# Patient Record
Sex: MALE | Race: ASIAN | URBAN - METROPOLITAN AREA
[De-identification: names, ages, dates, MRNs, and addresses within clinical notes are randomized per-mention and may not be internally consistent; named-entity substitution may affect disease eponyms.]

---

## 2017-01-27 ENCOUNTER — OFFICE VISIT (OUTPATIENT)
Dept: ENDOCRINOLOGY | Age: 62
End: 2017-01-27

## 2017-01-27 VITALS
BODY MASS INDEX: 21.19 KG/M2 | HEART RATE: 74 BPM | SYSTOLIC BLOOD PRESSURE: 99 MMHG | DIASTOLIC BLOOD PRESSURE: 60 MMHG | RESPIRATION RATE: 16 BRPM | OXYGEN SATURATION: 98 % | WEIGHT: 148 LBS | HEIGHT: 70 IN

## 2017-01-27 DIAGNOSIS — Z79.4 TYPE 2 DIABETES MELLITUS WITH DIABETIC NEUROPATHY, WITH LONG-TERM CURRENT USE OF INSULIN (HCC): Primary | ICD-10-CM

## 2017-01-27 DIAGNOSIS — E05.90 SUBCLINICAL HYPERTHYROIDISM: ICD-10-CM

## 2017-01-27 DIAGNOSIS — E11.40 TYPE 2 DIABETES MELLITUS WITH DIABETIC NEUROPATHY, WITH LONG-TERM CURRENT USE OF INSULIN (HCC): Primary | ICD-10-CM

## 2017-01-27 RX ORDER — INSULIN DETEMIR 100 [IU]/ML
INJECTION, SOLUTION SUBCUTANEOUS
Refills: 6 | COMMUNITY
Start: 2016-12-24 | End: 2017-01-27 | Stop reason: DRUGHIGH

## 2017-01-27 RX ORDER — PEN NEEDLE, DIABETIC 32GX 5/32"
NEEDLE, DISPOSABLE MISCELLANEOUS
Refills: 6 | COMMUNITY
Start: 2016-12-24

## 2017-01-27 RX ORDER — METFORMIN HYDROCHLORIDE 500 MG/1
500 TABLET ORAL 2 TIMES DAILY WITH MEALS
Qty: 180 TAB | Refills: 3 | Status: SHIPPED | OUTPATIENT
Start: 2017-01-27

## 2017-01-27 RX ORDER — PEN NEEDLE, DIABETIC 31 GX3/16"
1 NEEDLE, DISPOSABLE MISCELLANEOUS DAILY
Qty: 100 PEN NEEDLE | Refills: 11 | Status: SHIPPED | OUTPATIENT
Start: 2017-01-27

## 2017-01-27 NOTE — MR AVS SNAPSHOT
Visit Information Date & Time Provider Department Dept. Phone Encounter #  
 1/27/2017  1:30 PM Greyson Alanis, [address] [place] [address] Diabetes and Endocrinology [phone] [id number] Follow-up Instructions Return in about 6 weeks (around 3/10/2017). Upcoming Health Maintenance Date Due Hepatitis C Screening 1955 FOOT EXAM Q1 12/9/1967 EYE EXAM RETINAL OR DILATED Q1 12/9/1967 Pneumococcal 19-64 Medium Risk (1 of 1 - PPSV23) 12/9/1976 FOBT Q 1 YEAR AGE 50-75 12/9/2007 DTaP/Tdap/Td series (2 - Td) 1/15/2013 HEMOGLOBIN A1C Q6M 10/1/2014 MICROALBUMIN Q1 4/1/2015 LIPID PANEL Q1 4/1/2015 INFLUENZA AGE 9 TO ADULT 8/1/2016 Allergies as of 1/27/2017  Review Complete On: 1/27/2017 By: Greyson Alains MD  
  
 Severity Noted Reaction Type Reactions Metformin  02/23/2010    Other (comments) Weight loss Current Immunizations  Never Reviewed Name Date TDAP Vaccine 1/15/2003 Not reviewed this visit You Were Diagnosed With   
  
 Codes Comments Type 2 diabetes mellitus with diabetic neuropathy, with long-term current use of insulin (HCC)    -  Primary ICD-10-CM: E11.40, Z79.4 ICD-9-CM: 250.60, 357.2, V58.67 Vitals BP Pulse Resp Height(growth percentile) Weight(growth percentile) SpO2  
 99/60 74 16 5' 10\" (1.778 m) 148 lb (67.1 kg) 98% BMI Smoking Status 21.24 kg/m2 Never Smoker Vitals History BMI and BSA Data Body Mass Index Body Surface Area  
 21.24 kg/m 2 1.82 m 2 Preferred Pharmacy Pharmacy Name Phone [facility], [facility] [phone] Your Updated Medication List  
  
   
This list is accurate as of: 1/27/17  2:55 PM.  Always use your most recent med list.  
  
  
  
  
 ACCU-CHEK COMPACT TEST Strp Generic drug:  Blood Sugar Diagnostic, Drum  
  
 aspirin 81 mg tablet Take 81 mg by mouth daily. empagliflozin 10 mg tablet Commonly known as:  Ritta Seat Take 1 Tab by mouth daily. glyBURIDE 1.25 mg tablet Commonly known as:  [employer]o Police TAKE 1 TABLET BY MOUTH TWICE A DAY WITH MEALS  
  
 insulin detemir 100 unit/mL (3 mL) Inpn Commonly known as:  Mynor [place] Inject 15 units daily  
  
 metFORMIN 500 mg tablet Commonly known as:  GLUCOPHAGE Take 1 Tab by mouth two (2) times daily (with meals). * Carmelita Pen Needle 32 gauge x 5/32\" Ndle Generic drug:  Insulin Needles (Disposable) USE TO INJECT LEVEMIR DAILY * Insulin Needles (Disposable) 32 gauge x 5/32\" Ndle Commonly known as:  Carmelita Pen Needle 1 Each by Does Not Apply route daily. simvastatin 20 mg tablet Commonly known as:  ZOCOR Take 1 Tab by mouth nightly. Needs fasting office visit before next refill VITAMIN B-12 PO Take  by mouth. * Notice: This list has 2 medication(s) that are the same as other medications prescribed for you. Read the directions carefully, and ask your doctor or other care provider to review them with you. Prescriptions Sent to Pharmacy Refills  
 empagliflozin (JARDIANCE) 10 mg tablet 1 Sig: Take 1 Tab by mouth daily. Class: Normal  
 Pharmacy: [facility] Ph #: [phone] Route: Oral  
 insulin detemir (LEVEMIR FLEXTOUCH) 100 unit/mL (3 mL) inpn 3 Sig: Inject 15 units daily Class: Normal  
 Pharmacy: [facility] Ph #: [phone]  
 metFORMIN (GLUCOPHAGE) 500 mg tablet 3 Sig: Take 1 Tab by mouth two (2) times daily (with meals). Class: Normal  
 Pharmacy: [facility] Ph #: [phone]  Route: Oral  
 Insulin Needles, Disposable, (SHAQ PEN NEEDLE) 32 gauge x \" ndle 11 Si Each by Does Not Apply route daily. Class: Normal  
 Pharmacy: [facility] #: [phone] Route: Does Not Apply We Performed the Following HEMOGLOBIN A1C WITH EAG [17714 CPT(R)] METABOLIC PANEL, BASIC [16415 CPT(R)] T3, FREE R7808030 CPT(R)] TSH AND FREE T4 [26009 CPT(R)] VITAMIN B12 [id number] CPT(R)] Follow-up Instructions Return in about 6 weeks (around 3/10/2017). Patient Instructions Diabetes Instructions: 
- start Jardiance 10mg daily every morning 
- start metformin 500mg twice a day (with breakfast and dinner) 
- continue Levemir 15 units daily Check blood sugar at least TWICE a day: every morning when you wake up and at bedtime. Keep a record of your values and bring this or your glucometer with you to your next visit. Diet instructions: 
Reduce the amount of carbohydrates in your diet. This means avoiding sweets, sodas, or desserts AND also reducing the amount of bread, pasta, rice, potatoes, corn, and crackers that you eat. Do not have more than one serving of carbs per meal (for example: do not eat a sandwich and potato chips in the same meal). 
  
AIM FOR LESS THAN 30-40 GRAMS OF NET CARBS PER MEAL Net carbs = total carbs (g) - fiber (g) Read food labels! Avoid food with added sugar or anything with more than 5g sugar per serving. Focus on eating mostly protein (meat, poultry, fish, shellfish, eggs), healthy fats (avocados, nuts, cheese, olive or coconut oil) and non-starchy vegetables (greens, carrots, tomatoes, bell peppers, broccoli, brussels sprouts, green beans, etc). If you fill yourself up with these foods, you won't even want the carbs.  
 
Minimize your fruit intake as much as possible, no more than one serving per day. When you do eat fruit, choose lower sugar options like fresh berries. 
  
BREAKFAST: whole eggs, veggies, omelet, plain yogurt, sotelo, sausage LUNCH: salad with meat/poultry, veggies, cheese, nuts; low carb wrap with veggies and meat DINNER: any type of meat/poultry or seafood (without breading), non-starchy vegetables 
  
GOOD LOW-CARB SNACK OPTIONS: string cheese, Babybel cheese, carrots and celery with Ranch dressing, nuts (almonds, pistachios, walnuts, etc), meat (snack size salami, ham, or turkey) BEVERAGES: water, water, water Other options: unsweet tea (okay to add a pack of Splenda if needed), sparkling water without calories (ex: [place], [place]che, etc), coffee (unsweeted creamer is fine) Alcohol: (always in moderation) - lower carb options include red or white wine and champagne (the drier, the better); light beers like Michelob Ultra; some liquors (just avoid the sweet mixers like juices and sodas) Introducing hospitals & HEALTH SERVICES! Dear Erika Armstrong: Thank you for requesting a [employer] account. Our records indicate that you already have an active [employer] account. You can access your account anytime at [email or web address] Did you know that you can access your hospital and ER discharge instructions at any time in [employer]? You can also review all of your test results from your hospital stay or ER visit. Additional Information If you have questions, please visit the Frequently Asked Questions section of the [employer] website at [email or web address] Remember, [employer] is NOT to be used for urgent needs. For medical emergencies, dial 911. Now available from your iPhone and Android! Please provide this summary of care documentation to your next provider. Your primary care clinician is listed as Corey Fleming. If you have any questions after today's visit, please call [phone].

## 2017-01-27 NOTE — PROGRESS NOTES
Endocrinology New Patient Visit    Chief Complaint: Type 2 diabetes    Referring provider:  Diana Ramsay MD    History of Present Illness: Donn Devine is a 63 y.o. male who is self-referred for evaluation of type 2 diabetes mellitus with last A1c 7.4% in September. He was previously followed by Dr Padmini Jones, last visit was in September. He was diagnosed with diabetes 17 years ago. He was initially treated with metformin. This made him lose about 10 lbs. He was taking Januvia but this gave him trouble with his urine and numbness in his hands. He was switched to glyburide instead of Januvia. When he saw Dr Padmini Jones last, he was just taking glyburide and metformin. There was mention of starting Levemir and pt was given a sample pen but was not instructed to start it yet. His endocrinology follow-up was not scheduled until March but he became worried about his blood sugars which were running in the upper 100s-200 range (sometimes as high as 300) on his glyburide and metformin combination. Current glycemic medication regimen is metformin 850mg BID and levemir 15 units daily. He just started taking Levemir on 1/11/16. He brings in a detailed spreadsheet with his medications and blood sugars today. He started with levemir 10 units + metformin 500mg daily. After two days he increased the metformin to 850mg. After 5 days, he doubled the metformin. Home blood glucose monitoring frequency: 1-4 times/day  Glucose range at home: since starting metformin/levemir regimen above, fasting blood sugars have ranged from  and post-prandial values have ranged from 194-363. The patient has not had hypoglycemia. He has no known complications other than some neuropathy symptoms (in fingers) when his blood sugars are high. This has improved since starting the Levemir. Weight is stably low, BMI is 21. He does exercise at the gym 4 times per week (weights, elliptical both 30 minutes each).   He does try to restrict dietary carbohydrate intake, avoids simple carbs like naan and excess rice because it increases his blood sugar. Eats 3-4 meals/day and does snack once a day on almonds. Typical meals are as follows:  Breakfast: sandwich with avocado and banana on whole grain bread  Lunch: subway sandwich or fish  Dinner: curries (wife cooks) with meat, vegetables, and rice    He also has a history of abnormal TSH values, lowest was 0.381 with FT4 of 0.89 (from his spreadsheet, reference ranges not listed). He has not been on any thyroid specific medications. Review of Systems   General ROS: negative  Ophthalmic ROS: negative  ENT ROS: negative  Endocrine ROS: negative  Respiratory ROS: no cough, shortness of breath, or wheezing  Cardiovascular ROS: no chest pain or dyspnea on exertion  Gastrointestinal ROS: no abdominal pain, change in bowel habits, or black or bloody stools  Genito-Urinary ROS: no dysuria, trouble voiding, or hematuria  Musculoskeletal ROS: negative  Neurological ROS: positive for - numbness/tingling  Dermatological ROS: negative    Problem List:  Patient Active Problem List   Diagnosis Code    Type II or unspecified type diabetes mellitus without mention of complication, not stated as uncontrolled E11.9    Spondylodesis     Other and unspecified hyperlipidemia E78.5       Past Medical History:    Past Medical History   Diagnosis Date    Other and unspecified hyperlipidemia 2/23/2010    Spondylodesis 7/1/2004    Type II or unspecified type diabetes mellitus without mention of complication, not stated as uncontrolled 2/23/2010       Past Surgical History:  History reviewed. No pertinent past surgical history. Social History:  Social History     Social History    Marital status:      Spouse name: N/A    Number of children: N/A    Years of education: N/A     Occupational History    Not on file.      Social History Main Topics    Smoking status: Never Smoker    Smokeless tobacco: Never Used    Alcohol use No    Drug use: No    Sexual activity: Yes     Other Topics Concern    Not on file     Social History Narrative       Family History:  Family History   Problem Relation Age of Onset    Stroke Mother     Heart Disease Father        Medications:     Current Outpatient Prescriptions:     LEVEMIR FLEXTOUCH 100 unit/mL (3 mL) inpn, USE 10 UNITS AT BEDTIME, Disp: , Rfl: 6    SHAQ PEN NEEDLE 32 gauge x 5/32\" ndle, USE TO INJECT LEVEMIR DAILY, Disp: , Rfl: 6    simvastatin (ZOCOR) 20 mg tablet, Take 1 Tab by mouth nightly. Needs fasting office visit before next refill, Disp: 30 Tab, Rfl: 0    metFORMIN (GLUCOPHAGE) 850 mg tablet, TAKE 1 TABLET BY MOUTH TWICE A DAY WITH MEALS, Disp: 60 Tab, Rfl: 2    ACCU-CHEK COMPACT TEST strip, , Disp: , Rfl:     CYANOCOBALAMIN (VITAMIN B-12 PO), Take  by mouth., Disp: , Rfl:     glyBURIDE (DIABETA) 1.25 mg tablet, TAKE 1 TABLET BY MOUTH TWICE A DAY WITH MEALS, Disp: 180 Tab, Rfl: 0    aspirin 81 mg Tab, Take 81 mg by mouth daily. , Disp: , Rfl:     Allergies: Allergies   Allergen Reactions    Metformin Other (comments)     Weight loss       Physical Examination:  Blood pressure 99/60, pulse 74, resp. rate 16, height 5' 10\" (1.778 m), weight 148 lb (67.1 kg), SpO2 98 %. Gen: no acute distress  HEENT: mucous membranes moist, normocephalic, non traumatic  Thyroid: no enlargement or nodules noted  PULM: unlabored respirations  GI: soft non tender, non distended. EXT: no clubbing, cyanosis or edema  Neuro: grossly non focal  Psych: pleasant, good insight into medical hx  Skin: warm, dry    Clinical Data Review: There are no results available in [facility]. Pertinent lab results from outside records are transcribed in HPI and scanned into the medical record.     Assessment and Plan:  Patient is a 63y.o. year old male here for type 2 diabetes, control of which recently deteriorated on sulfonylurea therapy + metformin, but has now improved since the initiation of basal insulin. BG data shows consistent uptrend throughout the day, indicating he would benefit from the addition of an agent to provide prandial glucose coverage. Discussed various options (including mealtime insulin), DPP4, and SGLT2i. Since he had previous side effects with Januvia, will start Jardiance 10mg daily. 1. Type 2 diabetes mellitus with diabetic neuropathy, with long-term current use of insulin     Glycemic Medication Changes:  - start Jardiance 10mg daily  - decrease metformin to 500mg BID (pt preference)  - continue Levemir 15 units daily  - check BG 2-4 times/day and bring log to next visit in 6 weeks    DM Health Maintenance: pertinent items updated in HM tab  A1c: update today  Cv/Lipids: on simvastatin, last lipids in 9/2016 (LDL 68)  BP/Renal: BP at goal, not on ACEi, microalbumin: nonelevated in 2015, check BMP today  Vaccines: per PCP  Podiatry: no active issues, encouraged well-fitting footwear and daily inspection  Neuro: check B12 level today, update foot exam at next visit  Ophtho: yearly eye exam recommended  Diet and exercise: discussed healthy eating and exercise recommendations, particularly reduction in dietary carbohydrates     2. Subclinical hyperthyroidism: borderline low TSH values with normal FT4. Not on medical therapy. Recheck TSH and FT4 today. May need to pursue US or uptake/scan. We have discussed the importance of the need for good blood pressure and glycemic control to further reduce the risks of microvascular and macrovascular complications. We have discussed how to recognize and treat hypoglycemia. He will notify me in between appointments for hypoglycemia or persistent hyperglycemia and has my contact information. I spent 60 minutes with the patient today and > 50% of the time was spent counseling the patient about diabetes management including medication options and dietary modification.  Patient verbalized an understanding and will return to clinic in 6 weeks. Thank you for the opportunity to participate in this patient's care.     Pastor Kayla MD  [facility]  [facility]

## 2017-01-27 NOTE — PATIENT INSTRUCTIONS
Diabetes Instructions:  - start Jardiance 10mg daily every morning  - start metformin 500mg twice a day (with breakfast and dinner)  - continue Levemir 15 units daily    Check blood sugar at least TWICE a day: every morning when you wake up and at bedtime. Keep a record of your values and bring this or your glucometer with you to your next visit. Diet instructions:  Reduce the amount of carbohydrates in your diet. This means avoiding sweets, sodas, or desserts AND also reducing the amount of bread, pasta, rice, potatoes, corn, and crackers that you eat. Do not have more than one serving of carbs per meal (for example: do not eat a sandwich and potato chips in the same meal).     AIM FOR LESS THAN 30-40 GRAMS OF NET CARBS PER MEAL  Net carbs = total carbs (g) - fiber (g)  Read food labels! Avoid food with added sugar or anything with more than 5g sugar per serving. Focus on eating mostly protein (meat, poultry, fish, shellfish, eggs), healthy fats (avocados, nuts, cheese, olive or coconut oil) and non-starchy vegetables (greens, carrots, tomatoes, bell peppers, broccoli, brussels sprouts, green beans, etc). If you fill yourself up with these foods, you won't even want the carbs. Minimize your fruit intake as much as possible, no more than one serving per day.  When you do eat fruit, choose lower sugar options like fresh berries.     BREAKFAST: whole eggs, veggies, omelet, plain yogurt, sotelo, sausage   LUNCH: salad with meat/poultry, veggies, cheese, nuts; low carb wrap with veggies and meat  DINNER: any type of meat/poultry or seafood (without breading), non-starchy vegetables     GOOD LOW-CARB SNACK OPTIONS: string cheese, Babybel cheese, carrots and celery with Ranch dressing, nuts (almonds, pistachios, walnuts, etc), meat (snack size salami, ham, or turkey)    BEVERAGES: water, water, water  Other options: unsweet tea (okay to add a pack of Splenda if needed), sparkling water without calories (ex: [place], etc), coffee (unsweeted creamer is fine)  Alcohol: (always in moderation) - lower carb options include red or white wine and champagne (the drier, the better); light beers like [place] Ultra; some liquors (just avoid the sweet mixers like juices and sodas)

## 2017-02-01 ENCOUNTER — PATIENT MESSAGE (OUTPATIENT)
Dept: ENDOCRINOLOGY | Age: 62
End: 2017-02-01

## 2017-02-01 RX ORDER — GLIPIZIDE 2.5 MG/1
2.5 TABLET, EXTENDED RELEASE ORAL DAILY
Qty: 90 TAB | Refills: 1 | Status: SHIPPED | OUTPATIENT
Start: 2017-02-01

## 2017-02-01 NOTE — TELEPHONE ENCOUNTER
From: Santiago Obrien  To: Colette Wright MD  Sent: 2/1/2017 10:40 AM EST  Subject: Prescription Question    Good morning Dr. Ngozi Montemayor. I have a quick question. Instead of Jardiance, can I start with glipizide along with Levemir 15 units and Metformin 500 mg twice daily? I feel more at ease to take an established drug than a new one. If it doesn't work well, I can move to [place] after my next visit. I haven't gone to the pharmacy yet.    Mirna Colon  [phone]

## 2018-01-10 RX ORDER — PEN NEEDLE, DIABETIC 32GX 5/32"
NEEDLE, DISPOSABLE MISCELLANEOUS
Qty: 100 PEN NEEDLE | Refills: 0 | OUTPATIENT
Start: 2018-01-10